# Patient Record
Sex: FEMALE | Race: WHITE | Employment: FULL TIME | URBAN - METROPOLITAN AREA
[De-identification: names, ages, dates, MRNs, and addresses within clinical notes are randomized per-mention and may not be internally consistent; named-entity substitution may affect disease eponyms.]

---

## 2019-09-25 ENCOUNTER — HOSPITAL ENCOUNTER (EMERGENCY)
Age: 59
Discharge: HOME | End: 2019-09-25
Attending: EMERGENCY MEDICINE
Payer: COMMERCIAL

## 2019-09-25 ENCOUNTER — APPOINTMENT (OUTPATIENT)
Dept: GENERAL RADIOLOGY | Age: 59
End: 2019-09-25
Payer: COMMERCIAL

## 2019-09-25 VITALS
DIASTOLIC BLOOD PRESSURE: 86 MMHG | BODY MASS INDEX: 27.62 KG/M2 | OXYGEN SATURATION: 99 % | HEART RATE: 82 BPM | SYSTOLIC BLOOD PRESSURE: 151 MMHG | HEIGHT: 64 IN | RESPIRATION RATE: 16 BRPM | WEIGHT: 161.8 LBS | TEMPERATURE: 97.8 F

## 2019-09-25 DIAGNOSIS — K56.41 FECAL IMPACTION IN RECTUM (HCC): Primary | ICD-10-CM

## 2019-09-25 PROCEDURE — 99283 EMERGENCY DEPT VISIT LOW MDM: CPT

## 2019-09-25 PROCEDURE — 74022 RADEX COMPL AQT ABD SERIES: CPT

## 2019-09-25 ASSESSMENT — PAIN DESCRIPTION - FREQUENCY: FREQUENCY: INTERMITTENT

## 2019-09-25 ASSESSMENT — ENCOUNTER SYMPTOMS
CONSTIPATION: 1
BLOOD IN STOOL: 0
ABDOMINAL PAIN: 1
ANAL BLEEDING: 0

## 2019-09-25 ASSESSMENT — PAIN DESCRIPTION - LOCATION: LOCATION: ABDOMEN

## 2019-09-25 ASSESSMENT — PAIN SCALES - GENERAL: PAINLEVEL_OUTOF10: 8

## 2019-09-25 ASSESSMENT — PAIN DESCRIPTION - PAIN TYPE: TYPE: ACUTE PAIN

## 2019-09-25 ASSESSMENT — PAIN DESCRIPTION - DESCRIPTORS: DESCRIPTORS: CRAMPING

## 2019-09-25 NOTE — ED PROVIDER NOTES
and exam.  She is return for any worsening pain        Clinical Impression     1.  Fecal impaction in rectum [facility])        Disposition/Plan     PATIENT REFERRED TO:  Pravin Cervantes MD  [address] [address] [address] [facility]  [phone]    Schedule an appointment as soon as possible for a visit         DISCHARGE MEDICATIONS:  New Prescriptions    No medications on file       DISPOSITION Decision To Discharge 09/25/2019 01:48:26 PM      Hira Xavier MD  09/25/19 [id number]

## 2024-04-05 ENCOUNTER — NURSE ONLY (OUTPATIENT)
Dept: CARDIOLOGY CLINIC | Age: 64
End: 2024-04-05

## 2024-04-05 DIAGNOSIS — Z01.818 PRE-OP EVALUATION: Primary | ICD-10-CM

## 2025-02-17 ENCOUNTER — NURSE ONLY (OUTPATIENT)
Dept: CARDIOLOGY CLINIC | Age: 65
End: 2025-02-17

## 2025-02-17 DIAGNOSIS — Z01.818 PRE-OP EVALUATION: Primary | ICD-10-CM
